# Patient Record
Sex: FEMALE | Race: WHITE | URBAN - METROPOLITAN AREA
[De-identification: names, ages, dates, MRNs, and addresses within clinical notes are randomized per-mention and may not be internally consistent; named-entity substitution may affect disease eponyms.]

---

## 2018-02-08 ENCOUNTER — APPOINTMENT (OUTPATIENT)
Dept: CT IMAGING | Facility: CLINIC | Age: 43
End: 2018-02-08
Attending: PHYSICIAN ASSISTANT
Payer: COMMERCIAL

## 2018-02-08 ENCOUNTER — HOSPITAL ENCOUNTER (EMERGENCY)
Facility: CLINIC | Age: 43
Discharge: HOME | End: 2018-02-08
Attending: EMERGENCY MEDICINE | Admitting: EMERGENCY MEDICINE
Payer: COMMERCIAL

## 2018-02-08 VITALS
TEMPERATURE: 97.7 F | WEIGHT: 180 LBS | BODY MASS INDEX: 29.99 KG/M2 | SYSTOLIC BLOOD PRESSURE: 136 MMHG | RESPIRATION RATE: 16 BRPM | HEIGHT: 65 IN | OXYGEN SATURATION: 100 % | DIASTOLIC BLOOD PRESSURE: 51 MMHG

## 2018-02-08 DIAGNOSIS — R10.31 ABDOMINAL PAIN, RIGHT LOWER QUADRANT: ICD-10-CM

## 2018-02-08 DIAGNOSIS — N28.1 RENAL CYST: ICD-10-CM

## 2018-02-08 DIAGNOSIS — R10.9 FLANK PAIN: ICD-10-CM

## 2018-02-08 LAB
ALBUMIN SERPL-MCNC: 3.7 G/DL (ref 3.4–5)
ALBUMIN UR-MCNC: NEGATIVE MG/DL
ALP SERPL-CCNC: 77 U/L (ref 40–150)
ALT SERPL W P-5'-P-CCNC: 25 U/L (ref 0–50)
ANION GAP SERPL CALCULATED.3IONS-SCNC: 3 MMOL/L (ref 3–14)
APPEARANCE UR: CLEAR
AST SERPL W P-5'-P-CCNC: 14 U/L (ref 0–45)
BASOPHILS # BLD AUTO: 0 10E9/L (ref 0–0.2)
BASOPHILS NFR BLD AUTO: 0.2 %
BILIRUB SERPL-MCNC: 0.4 MG/DL (ref 0.2–1.3)
BILIRUB UR QL STRIP: NEGATIVE
BUN SERPL-MCNC: 16 MG/DL (ref 7–30)
CALCIUM SERPL-MCNC: 8.7 MG/DL (ref 8.5–10.1)
CHLORIDE SERPL-SCNC: 106 MMOL/L (ref 94–109)
CO2 SERPL-SCNC: 29 MMOL/L (ref 20–32)
COLOR UR AUTO: YELLOW
CREAT SERPL-MCNC: 0.63 MG/DL (ref 0.52–1.04)
DIFFERENTIAL METHOD BLD: NORMAL
EOSINOPHIL # BLD AUTO: 0 10E9/L (ref 0–0.7)
EOSINOPHIL NFR BLD AUTO: 0.1 %
ERYTHROCYTE [DISTWIDTH] IN BLOOD BY AUTOMATED COUNT: 12.8 % (ref 10–15)
GFR SERPL CREATININE-BSD FRML MDRD: >90 ML/MIN/1.7M2
GLUCOSE SERPL-MCNC: 81 MG/DL (ref 70–99)
GLUCOSE UR STRIP-MCNC: NEGATIVE MG/DL
HCT VFR BLD AUTO: 41.2 % (ref 35–47)
HGB BLD-MCNC: 14.2 G/DL (ref 11.7–15.7)
HGB UR QL STRIP: NEGATIVE
IMM GRANULOCYTES # BLD: 0 10E9/L (ref 0–0.4)
IMM GRANULOCYTES NFR BLD: 0.1 %
KETONES UR STRIP-MCNC: NEGATIVE MG/DL
LEUKOCYTE ESTERASE UR QL STRIP: NEGATIVE
LYMPHOCYTES # BLD AUTO: 3.2 10E9/L (ref 0.8–5.3)
LYMPHOCYTES NFR BLD AUTO: 39.4 %
MCH RBC QN AUTO: 31.4 PG (ref 26.5–33)
MCHC RBC AUTO-ENTMCNC: 34.5 G/DL (ref 31.5–36.5)
MCV RBC AUTO: 91 FL (ref 78–100)
MONOCYTES # BLD AUTO: 0.5 10E9/L (ref 0–1.3)
MONOCYTES NFR BLD AUTO: 5.6 %
NEUTROPHILS # BLD AUTO: 4.5 10E9/L (ref 1.6–8.3)
NEUTROPHILS NFR BLD AUTO: 54.6 %
NITRATE UR QL: NEGATIVE
NRBC # BLD AUTO: 0 10*3/UL
NRBC BLD AUTO-RTO: 0 /100
PH UR STRIP: 6 PH (ref 5–7)
PLATELET # BLD AUTO: 314 10E9/L (ref 150–450)
POTASSIUM SERPL-SCNC: 3.7 MMOL/L (ref 3.4–5.3)
PROT SERPL-MCNC: 7.3 G/DL (ref 6.8–8.8)
RBC # BLD AUTO: 4.52 10E12/L (ref 3.8–5.2)
SODIUM SERPL-SCNC: 138 MMOL/L (ref 133–144)
SOURCE: NORMAL
SP GR UR STRIP: <=1.005 (ref 1–1.03)
UROBILINOGEN UR STRIP-ACNC: 0.2 EU/DL (ref 0.2–1)
WBC # BLD AUTO: 8.2 10E9/L (ref 4–11)

## 2018-02-08 PROCEDURE — 85025 COMPLETE CBC W/AUTO DIFF WBC: CPT | Performed by: EMERGENCY MEDICINE

## 2018-02-08 PROCEDURE — 74176 CT ABD & PELVIS W/O CONTRAST: CPT

## 2018-02-08 PROCEDURE — 96360 HYDRATION IV INFUSION INIT: CPT

## 2018-02-08 PROCEDURE — 81003 URINALYSIS AUTO W/O SCOPE: CPT | Performed by: EMERGENCY MEDICINE

## 2018-02-08 PROCEDURE — 99284 EMERGENCY DEPT VISIT MOD MDM: CPT | Mod: 25

## 2018-02-08 PROCEDURE — 25000128 H RX IP 250 OP 636: Performed by: PHYSICIAN ASSISTANT

## 2018-02-08 PROCEDURE — 80053 COMPREHEN METABOLIC PANEL: CPT | Performed by: EMERGENCY MEDICINE

## 2018-02-08 PROCEDURE — 96361 HYDRATE IV INFUSION ADD-ON: CPT

## 2018-02-08 RX ORDER — OXYCODONE AND ACETAMINOPHEN 5; 325 MG/1; MG/1
1-2 TABLET ORAL EVERY 4 HOURS PRN
Qty: 8 TABLET | Refills: 0 | Status: SHIPPED | OUTPATIENT
Start: 2018-02-08

## 2018-02-08 RX ORDER — ONDANSETRON 2 MG/ML
4 INJECTION INTRAMUSCULAR; INTRAVENOUS EVERY 30 MIN PRN
Status: DISCONTINUED | OUTPATIENT
Start: 2018-02-08 | End: 2018-02-08 | Stop reason: HOSPADM

## 2018-02-08 RX ADMIN — SODIUM CHLORIDE 1000 ML: 9 INJECTION, SOLUTION INTRAVENOUS at 16:35

## 2018-02-08 ASSESSMENT — ENCOUNTER SYMPTOMS
HEMATURIA: 1
BACK PAIN: 1
DIFFICULTY URINATING: 0
VOMITING: 1
FLANK PAIN: 1
NAUSEA: 1
ABDOMINAL PAIN: 1
DYSURIA: 0

## 2018-02-08 NOTE — ED PROVIDER NOTES
"  History     Chief Complaint:  Flank and RLQ abdominal pain     HPI   Erum Geiger is a 42 year old female with a history of anxiety who presents with a four days history of vague abdominal pain, a three-day history of flank pain, and a one-day history of hematuria. She mentions that she is under a significant amount of stress recently. She woke up four days ago feeling slightly lightheaded and overall felt ill, and she stayed in bed most of the day. Three days ago she began to have right-sided flank pain, which has extended into her abdomen in the right lower quadrant. She noticed blood in her urine yesterday. No history of kidney stones.  She denies any urinary symptoms including dysuria, frequency, or urgency. She did take Advil three times today. She has been slightly nauseous, but has not vomited. Tonight, she mentions that the RLQ pain is worse with movement. She has had little to no appetite for the past few days.     Allergies:  NKDA    Medications:    Clonazepam  Cymbalta   Effexor     Past Medical History:    Anxiety.     Past Surgical History:    Hysterectomy.     Family History:    No past pertinent family history.    Social History:  Negative for tobacco use.  Negative for alcohol use.    Review of Systems   Gastrointestinal: Positive for abdominal pain, nausea and vomiting.   Genitourinary: Positive for flank pain and hematuria. Negative for difficulty urinating, dysuria and urgency.   Musculoskeletal: Positive for back pain.   All other systems reviewed and are negative.    Physical Exam     Patient Vitals for the past 24 hrs:   BP Temp Temp src Heart Rate Resp SpO2 Height Weight   02/08/18 1604 136/51 97.7  F (36.5  C) Temporal 94 18 100 % 1.651 m (5' 5\") 81.6 kg (180 lb)     Physical Exam  General: Alert and interactive.   Eyes: The pupils are equal and round. EOMs intact. No scleral icterus.    Throat: No erythema or exudate.    Neck:Trachea is in the midline       CV: Regular rate and rhythm. " "S1 and S2 normal without murmur, click, gallop or rub.   Resp: Breath sounds are clear bilaterally, without rhonchi, wheezes, rales. Non-labored, no retractions or accessory muscle use.     GI: Abdomen is soft without distension.Tenderness to palpation in the right CVA and RLQ. Some guarding. No peritoneal signs.   MS: Moving all extremities well.   Skin:  Warm and dry. No rash or lesions noted.  Neuro:  Alert and oriented x 3. GCS 15. Full strength and sensation in all four extremities.    Psych: Awake. Alert.  Normal affect. Appropriate interactions.  Lymph: No anterior or posterior cervical lymphadenopathy noted.    Emergency Department Course     Imaging:  Radiographic findings were communicated with the patient who voiced understanding of the findings.  CT Abdomen/Pelvis w/o IV contrast-stone protocol:   \"1. No renal or ureteral calculi or evidence of urinary obstruction.  2. No other cause of acute pain identified in the abdomen or pelvis.  3. 1.3 cm indeterminate exophytic lesion in the right kidney. This  could represent a hemorrhagic or proteinaceous cyst, but a solid  lesion cannot be excluded. Recommend comparison with prior imaging  studies, if available. If not available, a renal ultrasound could be  considered for further evaluation.\"  Read as per radiology.     Laboratory:  CBC: WBC: 8.2, HGB: 14.2, PLT: 314  CMP: All WNL (Creatinine: 0.63)  UA with Microscopic: All WNL    Interventions:  1635 1000 ml NaCl    Emergency Department Course:  Past medical records, nursing notes, and vitals reviewed.   I performed an exam of the patient as documented above.   The above imaging and labs were obtained. Results above.  I rechecked patient.  I discussed the treatment plan with the patient. She expressed understanding of this plan and consented to discharge. Patient will be discharged home with instructions for care and follow up. In addition, the patient will return to the emergency department if symptoms " persist, worsen, if new symptoms arise or if there is any concern.  All questions were answered.    Impression & Plan    Medical Decision Making: rEum Geiger is a 42-year-old female who presents with a history of flank pain, abdominal pain, nausea, and a history of hematuria.  The patient has never had kidney stone. On exam she is mostly tender in the right lower quadrant, but she has some right-sided flank pain. Her abdominal exam is otherwise benign.  I did give the patient a liter of fluid, and ordered labs and a non-contrast CT. Her UA is completely clear, without any signs of hematuria. The UA also shows no sign of an infection, and I do not think pyelonephritis or acute cystitis is a probable diagnosis. CBC and CMP are completely within normal limits.  She does not have any elevation in her LFTs, and I do not think this is cholecystitis, choledocholithiasis, or any other biliary pathology.  CT scan showed a 1.3 cm indeterminate exophytic lesion in the right kidney. No previous imaging to compare. CT scan showed no signs of a kidney stone, appendicitis, colitis, or other intra-abdominal pathology. I did encourage the patient to follow-up with a primary care for further evaluation of the renal cyst. I gave her information for a primary care physician in the [place] system, as she mentions that she does not have a PCP that she sees. At his point, I do not have a clear indication for the cause of the patient's pain.  She should follow-up in the emergency room immediately if the pain worsens in any way or she develops nausea and vomiting that she cannot control. I did give her prescription for 8 pills of Percocet, which she can use to manage the pain. She does have a pain agreement, but it is for her clonazepam for anxiety. At this point, she is safe for discharge. She has no further questions nor concerns.     Diagnosis:    ICD-10-CM    1. Flank pain R10.9    2. Abdominal pain, right lower quadrant R10.31       Disposition: Discharged to home    Discharge Medications:  Discharge Medication List as of 2/8/2018  6:30 PM      START taking these medications    Details   oxyCODONE-acetaminophen (PERCOCET) 5-325 MG per tablet Take 1-2 tablets by mouth every 4 hours as needed for pain, Disp-8 tablet, R-0, Local Print           I, Di Del Rosario PA-C, interviewed the patient, explained the course of action and discussed the patient with Dr. Kathleen, who then evaluated the patient.    Di Del Rosario  2/8/2018    EMERGENCY DEPARTMENT       Di Del Rosario PA-C  02/08/18 8920

## 2018-02-08 NOTE — ED AVS SNAPSHOT
Emergency Department    [address] [facility] [address]    Phone:  [phone]    Fax:  [phone]                                       Erum Geiger   MRN: [id number]    Department:   Emergency Department   Date of Visit:  2/8/2018           Patient Information     Date Of Birth          1975        Your diagnoses for this visit were:     Flank pain Right side    Abdominal pain, right lower quadrant     Renal cyst        You were seen by Jackelyn Kathleen MD.      Follow-up Information     Schedule an appointment as soon as possible for a visit with Amilcar Rizvi MD.    Specialty:  Family Practice    Why:  For futher evaluation of renal cyst    Contact information:    [address] [address] [phone]          Follow up with  Emergency Department.    Specialty:  EMERGENCY MEDICINE    Why:  If symptoms worsen    Contact information:    [address] [facility] [address] [phone]        Discharge Instructions       Discharge Instructions  Abdominal Pain    Abdominal pain (belly pain) can be caused by many things. Your evaluation today does not show the exact cause for your pain. Your provider today has decided that it is unlikely your pain is due to a life threatening problem, or a problem requiring surgery or hospital admission. Sometimes those problems cannot be found right away, so it is very important that you follow up as directed.  Sometimes only the changes which occur over time allow the cause of your pain to be found.    Generally, every Emergency Department visit should have a follow-up clinic visit with either a primary or a specialty clinic/provider. Please follow-up as instructed by your emergency provider today. With abdominal pain, we often recommend very close follow-up, such as the following day.    ADULTS:  Return to the Emergency Department right away if:      You get an oral temperature above 102oF or as directed  by your provider.    You have blood in your stools. This may be bright red or appear as black, tarry stools.      You keep vomiting (throwing up) or cannot drink liquids.    You see blood when you vomit.     You cannot have a bowel movement or you cannot pass gas.    Your stomach gets bloated or bigger.    Your skin or the whites of your eyes look yellow.    You faint.    You have bloody, frequent or painful urination (peeing).    You have new symptoms or anything that worries you.    CHILDREN:  Return to the Emergency Department right away if your child has any of the above-listed symptoms or the following:      Pushes your hand away or screams/cries when his/her belly is touched.    You notice your child is very fussy or weak.    Your child is very tired and is too tired to eat or drink.    Your child is dehydrated.  Signs of dehydration can be:  o Significant change in the amount of wet diapers/urine.  o Your infant or child starts to have dry mouth and lips, or no saliva (spit) or tears.    PREGNANT WOMEN:  Return to the Emergency Department right away if you have any of the above-listed symptoms or the following:      You have bleeding, leaking fluid or passing tissue from the vagina.    You have worse pain or cramping, or pain in your shoulder or back.    You have vomiting that will not stop.    You have a temperature of 100oF or more.    Your baby is not moving as much as usual.    You faint.    You get a bad headache with or without eye problems and abdominal pain.    You have a seizure.    You have unusual discharge from your vagina and abdominal pain.    Abdominal pain is pretty common during pregnancy.  Your pain may or may not be related to your pregnancy. You should follow-up closely with your OB provider so they can evaluate you and your baby.  Until you follow-up with your regular provider, do the following:       Avoid sex and do not put anything in your vagina.    Drink clear fluids.    Only take  "medications approved by your provider.    MORE INFORMATION:    Appendicitis:  A possible cause of abdominal pain in any person who still has their appendix is acute appendicitis. Appendicitis is often hard to diagnose.  Testing does not always rule out early appendicitis or other causes of abdominal pain. Close follow-up with your provider and re-evaluations may be needed to figure out the reason for your abdominal pain.    Follow-up:  It is very important that you make an appointment with your clinic and go to the appointment.  If you do not follow-up with your primary provider, it may result in missing an important development which could result in permanent injury or disability and/or lasting pain.  If there is any problem keeping your appointment, call your provider or return to the Emergency Department.    Medications:  Take your medications as directed by your provider today.  Before using over-the-counter medications, ask your provider and make sure to take the medications as directed.  If you have any questions about medications, ask your provider.    Diet:  Resume your normal diet as much as possible, but do not eat fried, fatty or spicy foods while you have pain.  Do not drink alcohol or have caffeine.  Do not smoke tobacco.    Probiotics: If you have been given an antibiotic, you may want to also take a probiotic pill or eat yogurt with live cultures. Probiotics have \"good bacteria\" to help your intestines stay healthy. Studies have shown that probiotics help prevent diarrhea (loose stools) and other intestine problems (including C. diff infection) when you take antibiotics. You can buy these without a prescription in the pharmacy section of the store.     If you were given a prescription for medicine here today, be sure to read all of the information (including the package insert) that comes with your prescription.  This will include important information about the medicine, its side effects, and any " warnings that you need to know about.  The pharmacist who fills the prescription can provide more information and answer questions you may have about the medicine.  If you have questions or concerns that the pharmacist cannot address, please call or return to the Emergency Department.       Remember that you can always come back to the Emergency Department if you are not able to see your regular provider in the amount of time listed above, if you get any new symptoms, or if there is anything that worries you.      24 Hour Appointment Hotline       To make an appointment at any [facility], call [phone] ([phone]). If you don't have a family doctor or clinic, we will help you find one. [facility] clinics are conveniently located to serve the needs of you and your family.             Review of your medicines      START taking        Dose / Directions Last dose taken    oxyCODONE-acetaminophen 5-325 MG per tablet   Commonly known as:  PERCOCET   Dose:  1-2 tablet   Quantity:  8 tablet        Take 1-2 tablets by mouth every 4 hours as needed for pain   Refills:  0                Prescriptions were sent or printed at these locations (1 Prescription)                   Other Prescriptions                Printed at Department/Unit printer (1 of 1)         oxyCODONE-acetaminophen (PERCOCET) 5-325 MG per tablet                Procedures and tests performed during your visit     *UA reflex to Microscopic (ED Lab POCT Only 3-11)    Abd/pelvis CT no contrast - Stone Protocol    CBC with platelets + differential    Comprehensive metabolic panel      Orders Needing Specimen Collection     None      Pending Results     Date and Time Order Name Status Description    2/8/2018 1658 Abd/pelvis CT no contrast - Stone Protocol Preliminary             Pending Culture Results     No orders found from 2/6/2018 to 2/9/2018.            Pending Results Instructions     If you had any lab results that were not finalized at the  time of your Discharge, you can call the ED Lab Result RN at [phone]. You will be contacted by this team for any positive Lab results or changes in treatment. The nurses are available 7 days a week from 10A to 6:30P.  You can leave a message 24 hours per day and they will return your call.        Test Results From Your Hospital Stay        2/8/2018  4:44 PM      Component Results     Component Value Ref Range & Units Status    Color Urine Yellow  Final    Appearance Urine Clear  Final    Glucose Urine Negative NEG^Negative mg/dL Final    Bilirubin Urine Negative NEG^Negative Final    Ketones Urine Negative NEG^Negative mg/dL Final    Specific Gravity Urine <=1.005 1.003 - 1.035 Final    Blood Urine Negative NEG^Negative Final    pH Urine 6.0 5.0 - 7.0 pH Final    Protein Albumin Urine Negative NEG^Negative mg/dL Final    Urobilinogen Urine 0.2 0.2 - 1.0 EU/dL Final    Nitrite Urine Negative NEG^Negative Final    Leukocyte Esterase Urine Negative NEG^Negative Final    Source Midstream Urine  Final         2/8/2018  4:48 PM      Component Results     Component Value Ref Range & Units Status    WBC 8.2 4.0 - 11.0 10e9/L Final    RBC Count 4.52 3.8 - 5.2 10e12/L Final    Hemoglobin 14.2 11.7 - 15.7 g/dL Final    Hematocrit 41.2 35.0 - 47.0 % Final    MCV 91 78 - 100 fl Final    MCH 31.4 26.5 - 33.0 pg Final    MCHC 34.5 31.5 - 36.5 g/dL Final    RDW 12.8 10.0 - 15.0 % Final    Platelet Count 314 150 - 450 10e9/L Final    Diff Method Automated Method  Final    % Neutrophils 54.6 % Final    % Lymphocytes 39.4 % Final    % Monocytes 5.6 % Final    % Eosinophils 0.1 % Final    % Basophils 0.2 % Final    % Immature Granulocytes 0.1 % Final    Nucleated RBCs 0 0 /100 Final    Absolute Neutrophil 4.5 1.6 - 8.3 10e9/L Final    Absolute Lymphocytes 3.2 0.8 - 5.3 10e9/L Final    Absolute Monocytes 0.5 0.0 - 1.3 10e9/L Final    Absolute Eosinophils 0.0 0.0 - 0.7 10e9/L Final    Absolute Basophils 0.0 0.0 - 0.2 10e9/L Final     Abs Immature Granulocytes 0.0 0 - 0.4 10e9/L Final    Absolute Nucleated RBC 0.0  Final         2/8/2018  5:13 PM      Component Results     Component Value Ref Range & Units Status    Sodium 138 133 - 144 mmol/L Final    Potassium 3.7 3.4 - 5.3 mmol/L Final    Chloride 106 94 - 109 mmol/L Final    Carbon Dioxide 29 20 - 32 mmol/L Final    Anion Gap 3 3 - 14 mmol/L Final    Glucose 81 70 - 99 mg/dL Final    Urea Nitrogen 16 7 - 30 mg/dL Final    Creatinine 0.63 0.52 - 1.04 mg/dL Final    GFR Estimate >90 >60 mL/min/1.7m2 Final    Non  GFR Calc    GFR Estimate If Black >90 >60 mL/min/1.7m2 Final    African American GFR Calc    Calcium 8.7 8.5 - 10.1 mg/dL Final    Bilirubin Total 0.4 0.2 - 1.3 mg/dL Final    Albumin 3.7 3.4 - 5.0 g/dL Final    Protein Total 7.3 6.8 - 8.8 g/dL Final    Alkaline Phosphatase 77 40 - 150 U/L Final    ALT 25 0 - 50 U/L Final    AST 14 0 - 45 U/L Final         2/8/2018  6:10 PM      Narrative     CT ABDOMEN AND PELVIS WITHOUT CONTRAST   2/8/2018 5:43 PM     HISTORY: Right flank and right lower quadrant pain. Hematuria.    COMPARISON: None.    TECHNIQUE: Without intravenous or oral contrast, helical sections were  acquired from the top of the diaphragm through the pubic symphysis.  Coronal reconstructions were generated. Radiation dose for this scan  was reduced using automated exposure control, adjustment of the mA  and/or kV according to the patient's size, or iterative reconstruction  technique. (Renal stone protocol)    FINDINGS:     Right urinary tract: No renal or ureteral calculi. No dilatation of  the intrarenal collecting system or ureter. 1.3 x 1.3 cm intermediate  attenuation exophytic lesion in the interpolar region of the kidney  (series 2 image 43).    Left urinary tract: No renal or ureteral calculi. No dilatation of the  intrarenal collecting system or ureter.    Urinary bladder: No visualized calculi.    Remainder of the abdomen and pelvis: The liver,  spleen, pancreas and  adrenal glands are unremarkable to the limits of a noncontrast CT  scan. The gallbladder is present. The small and large bowel are normal  in caliber. The appendix is unremarkable. No bowel wall thickening,  pneumatosis or free intraperitoneal gas. The uterus is not visualized.  No enlarged lymph nodes or free fluid in the abdomen or pelvis.    Scan through the lower chest is unremarkable.        Impression     IMPRESSION:   1. No renal or ureteral calculi or evidence of urinary obstruction.  2. No other cause of acute pain identified in the abdomen or pelvis.  3. 1.3 cm indeterminate exophytic lesion in the right kidney. This  could represent a hemorrhagic or proteinaceous cyst, but a solid  lesion cannot be excluded. Recommend comparison with prior imaging  studies, if available. If not available, a renal ultrasound could be  considered for further evaluation.                Clinical Quality Measure: Blood Pressure Screening     Your blood pressure was checked while you were in the emergency department today. The last reading we obtained was  BP: 136/51 . Please read the guidelines below about what these numbers mean and what you should do about them.  If your systolic blood pressure (the top number) is less than 120 and your diastolic blood pressure (the bottom number) is less than 80, then your blood pressure is normal. There is nothing more that you need to do about it.  If your systolic blood pressure (the top number) is 120-139 or your diastolic blood pressure (the bottom number) is 80-89, your blood pressure may be higher than it should be. You should have your blood pressure rechecked within a year by a primary care provider.  If your systolic blood pressure (the top number) is 140 or greater or your diastolic blood pressure (the bottom number) is 90 or greater, you may have high blood pressure. High blood pressure is treatable, but if left untreated over time it can put you at risk  "for heart attack, stroke, or kidney failure. You should have your blood pressure rechecked by a primary care provider within the next 4 weeks.  If your provider in the emergency department today gave you specific instructions to follow-up with your doctor or provider even sooner than that, you should follow that instruction and not wait for up to 4 weeks for your follow-up visit.        Thank you for choosing [facility]       Thank you for choosing [facility] for your care. Our goal is always to provide you with excellent care. Hearing back from our patients is one way we can continue to improve our services. Please take a few minutes to complete the written survey that you may receive in the mail after you visit with us. Thank you!        [employer] Information     "[employer] lets you send messages to your doctor, view your test results, renew your prescriptions, schedule appointments and more. To sign up, go to [email or web address] . Click on \"Log in\" on the left side of the screen, which will take you to the Welcome page. Then click on \"Sign up Now\" on the right side of the page.     You will be asked to enter the access code listed below, as well as some personal information. Please follow the directions to create your username and password.     Your access code is: [email or web address]  Expires: 2018  6:29 PM     Your access code will  in 90 days. If you need help or a new code, please call your [facility] clinic or [phone].        Care EveryWhere ID     This is your Care EveryWhere ID. This could be used by other organizations to access your [facility] medical records  [id number]        Equal Access to Services     XIOMARA ELAINE : Hadii kimmie sellers Somary, waaxda luqadaha, qaybta kaalmatracey velazquez. So [facility] [phone].    ATENCIÓN: Si habla español, tiene a moss disposición servicios gratuitos de asistencia lingüística. Llame al [phone].    We comply with " applicable federal civil rights laws and [place] laws. We do not discriminate on the basis of race, color, national origin, age, disability, sex, sexual orientation, or gender identity.            After Visit Summary       This is your record. Keep this with you and show to your community pharmacist(s) and doctor(s) at your next visit.

## 2018-02-08 NOTE — ED AVS SNAPSHOT
Emergency Department    [address] [place] [address]    Phone:  [phone]    Fax:  [phone]                                       Erum Geiger   MRN: [id number]    Department:   Emergency Department   Date of Visit:  2/8/2018           After Visit Summary Signature Page     I have received my discharge instructions, and my questions have been answered. I have discussed any challenges I see with this plan with the nurse or doctor.    ..........................................................................................................................................  Patient/Patient Representative Signature      ..........................................................................................................................................  Patient Representative Print Name and Relationship to Patient    ..................................................               ................................................  Date                                            Time    ..........................................................................................................................................  Reviewed by Signature/Title    ...................................................              ..............................................  Date                                                            Time

## 2023-11-27 ENCOUNTER — MEDICAL CORRESPONDENCE (OUTPATIENT)
Dept: HEALTH INFORMATION MANAGEMENT | Facility: CLINIC | Age: 48
End: 2023-11-27

## 2023-12-17 ENCOUNTER — HEALTH MAINTENANCE LETTER (OUTPATIENT)
Age: 48
End: 2023-12-17